# Patient Record
Sex: MALE | Race: WHITE | URBAN - METROPOLITAN AREA
[De-identification: names, ages, dates, MRNs, and addresses within clinical notes are randomized per-mention and may not be internally consistent; named-entity substitution may affect disease eponyms.]

---

## 2018-05-13 ENCOUNTER — OFFICE VISIT (OUTPATIENT)
Dept: URGENT CARE | Facility: URGENT CARE | Age: 18
End: 2018-05-13
Payer: COMMERCIAL

## 2018-05-13 VITALS
TEMPERATURE: 98 F | SYSTOLIC BLOOD PRESSURE: 120 MMHG | RESPIRATION RATE: 16 BRPM | HEIGHT: 75 IN | HEART RATE: 90 BPM | DIASTOLIC BLOOD PRESSURE: 70 MMHG | BODY MASS INDEX: 21.76 KG/M2 | OXYGEN SATURATION: 98 % | WEIGHT: 175 LBS

## 2018-05-13 DIAGNOSIS — J20.8 ACUTE VIRAL BRONCHITIS: Primary | ICD-10-CM

## 2018-05-13 PROCEDURE — 99203 OFFICE O/P NEW LOW 30 MIN: CPT | Performed by: INTERNAL MEDICINE

## 2018-05-13 ASSESSMENT — ENCOUNTER SYMPTOMS
SHORTNESS OF BREATH: 0
WHEEZING: 1
MYALGIAS: 0

## 2018-05-13 NOTE — PROGRESS NOTES
"SUBJECTIVE:   Dexter Grove is a 17 year old male presenting with a chief complaint of   Chief Complaint   Patient presents with     Urgent Care     Pharyngitis     sick with cold symptoms and sore throat. Woke up worse yesterday. Has had URI for 2 weeks before this. Having chills and sweats now.        He is a new patient of [facility].    URI Adult    Onset of symptoms was yesterday morning.    Current and Associated symptoms: fever, chills, stuffy nose, cough - productive, sore throat and intermittent headache  Some blood streaked mucous    Has had 2 weeks nasal congestion  Treatment measures tried include Tylenol/Ibuprofen and OTC Cough med.  rest  Predisposing factors include ill contact: none known    Had strep test mid last week for hives.  Had rapid strep test negative       Review of Systems   Respiratory: Positive for wheezing. Negative for shortness of breath.    Musculoskeletal: Negative for myalgias.       Past Medical History:   Diagnosis Date     NO ACTIVE PROBLEMS      No family history on file.  Current Outpatient Prescriptions   Medication Sig Dispense Refill     MOTRIN 100 MG/5ML OR SUSP None Entered       ondansetron (ZOFRAN ODT) 4 MG disintegrating tablet Take 1 tablet (4 mg) by mouth 3 times daily (before meals) 6 tablet 0     Social History   Substance Use Topics     Smoking status: Never Smoker     Smokeless tobacco: Never Used     Alcohol use Not on file       OBJECTIVE  /70  Pulse 90  Temp 98  F (36.7  C) (Oral)  Resp 16  Ht 6' 3\" (1.905 m)  Wt 175 lb (79.4 kg)  SpO2 98%  BMI 21.87 kg/m2    Physical Exam   Constitutional: He appears well-developed and well-nourished.   HENT:   Nose: Nose normal.   Mouth/Throat: No oropharyngeal exudate.   Tm nl b  OP - large amount PND   Cardiovascular: Normal rate, regular rhythm and normal heart sounds.    Pulmonary/Chest: Effort normal and breath sounds normal.   Lymphadenopathy:     He has no cervical adenopathy.       Labs:  No results " found for this or any previous visit (from the past 24 hour(s)).        ASSESSMENT:      ICD-10-CM    1. Acute viral bronchitis J20.8         Medical Decision Making:    Differential Diagnosis:  URI Adult/Peds:  Bronchitis-viral and Viral upper respiratory illness    Serious Comorbid Conditions:  Adult:  None    PLAN:      Patient Instructions   Honey, menthol cough drops, vicks  Fluids  Rest    Ibuprofen    Call or return to clinic if symptoms worsen or fail to improve as anticipated.      Viral Bronchitis (Adult)    You have a viral bronchitis. Bronchitis is inflammation and swelling of the lining of the lungs. This is often caused by an infection. Symptoms include a dry, hacking cough that is worse at night. The cough may bring up yellow-green mucus. You may also feel short of breath or wheeze. Other symptoms may include tiredness, chest discomfort, and chills.  Bronchitis that is caused by a virus is not treated with antibiotics. Instead, medicines may be given to help relieve symptoms. Symptoms can last up to 2 weeks, although the cough may last much longer.  This illness is contagious during the first few days and is spread through the air by coughing and sneezing, or by direct contact (touching the sick person and then touching your own eyes, nose, or mouth).  Most viral illnesses resolve within 10 to 14 days with rest and simple home remedies, although they may sometimes last for several weeks.  Home care    If symptoms are severe, rest at home for the first 2 to 3 days. When you go back to your usual activities, don't let yourself get too tired.    Do not smoke. Also avoid being exposed to secondhand smoke.    You may use over-the-counter medicine to control fever or pain, unless another pain medicine was prescribed. If you have chronic liver or kidney disease or have ever had a stomach ulcer or gastrointestinal bleeding, talk with your healthcare provider before using these medicines. Also talk to your  provider if you are taking medicine to prevent blood clots. Aspirin should never be given to anyone younger than 18 years of age who is ill with a viral infection or fever. It may cause severe liver or brain damage.    Your appetite may be poor, so a light diet is fine. Avoid dehydration by drinking 6 to 8 glasses of fluids per day (such as water, soft drinks, sports drinks, juices, tea, or soup). Extra fluids will help loosen secretions in the nose and lungs.    Over-the-counter cough, cold, and sore-throat medicines will not shorten the length of the illness, but they may help to reduce symptoms. Don't use decongestants if you have high blood pressure.  Follow-up care  Follow up with your healthcare provider, or as advised. If you had an X-ray or ECG (electrocardiogram), a specialist will review it. You will be notified of any new findings that may affect your care.  If you are age 65 or older, or if you have a chronic lung disease or condition that affects your immune system, or you smoke, ask your healthcare provider about getting a pneumococcal vaccine and a yearly flu shot (influenza vaccine).  When to seek medical advice  Call your healthcare provider right away if any of these occur:    Fever of 100.4 F (38 C) or higher, or as directed by your healthcare provider    Coughing up increased amounts of colored sputum    Weakness, drowsiness, headache, facial pain, ear pain, or a stiff neck  Call 911  Call 911 if any of these occur:    Coughing up blood    Worsening weakness, drowsiness, headache, or stiff neck    Trouble breathing, wheezing, or pain with breathing  Date Last Reviewed: 9/13/2015 2000-2017 [employer]. [address] [place] [id number]. All rights reserved. This information is not intended as a substitute for professional medical care. Always follow your healthcare professional's instructions.

## 2018-05-13 NOTE — PATIENT INSTRUCTIONS
Honey, menthol cough drops, vicks  Fluids  Rest    Ibuprofen    Call or return to clinic if symptoms worsen or fail to improve as anticipated.      Viral Bronchitis (Adult)    You have a viral bronchitis. Bronchitis is inflammation and swelling of the lining of the lungs. This is often caused by an infection. Symptoms include a dry, hacking cough that is worse at night. The cough may bring up yellow-green mucus. You may also feel short of breath or wheeze. Other symptoms may include tiredness, chest discomfort, and chills.  Bronchitis that is caused by a virus is not treated with antibiotics. Instead, medicines may be given to help relieve symptoms. Symptoms can last up to 2 weeks, although the cough may last much longer.  This illness is contagious during the first few days and is spread through the air by coughing and sneezing, or by direct contact (touching the sick person and then touching your own eyes, nose, or mouth).  Most viral illnesses resolve within 10 to 14 days with rest and simple home remedies, although they may sometimes last for several weeks.  Home care    If symptoms are severe, rest at home for the first 2 to 3 days. When you go back to your usual activities, don't let yourself get too tired.    Do not smoke. Also avoid being exposed to secondhand smoke.    You may use over-the-counter medicine to control fever or pain, unless another pain medicine was prescribed. If you have chronic liver or kidney disease or have ever had a stomach ulcer or gastrointestinal bleeding, talk with your healthcare provider before using these medicines. Also talk to your provider if you are taking medicine to prevent blood clots. Aspirin should never be given to anyone younger than 18 years of age who is ill with a viral infection or fever. It may cause severe liver or brain damage.    Your appetite may be poor, so a light diet is fine. Avoid dehydration by drinking 6 to 8 glasses of fluids per day (such as water,  soft drinks, sports drinks, juices, tea, or soup). Extra fluids will help loosen secretions in the nose and lungs.    Over-the-counter cough, cold, and sore-throat medicines will not shorten the length of the illness, but they may help to reduce symptoms. Don't use decongestants if you have high blood pressure.  Follow-up care  Follow up with your healthcare provider, or as advised. If you had an X-ray or ECG (electrocardiogram), a specialist will review it. You will be notified of any new findings that may affect your care.  If you are age 65 or older, or if you have a chronic lung disease or condition that affects your immune system, or you smoke, ask your healthcare provider about getting a pneumococcal vaccine and a yearly flu shot (influenza vaccine).  When to seek medical advice  Call your healthcare provider right away if any of these occur:    Fever of 100.4 F (38 C) or higher, or as directed by your healthcare provider    Coughing up increased amounts of colored sputum    Weakness, drowsiness, headache, facial pain, ear pain, or a stiff neck  Call 911  Call 911 if any of these occur:    Coughing up blood    Worsening weakness, drowsiness, headache, or stiff neck    Trouble breathing, wheezing, or pain with breathing  Date Last Reviewed: 9/13/2015 2000-2017 [employer]. [address] [place] [address], [place] [address]. All rights reserved. This information is not intended as a substitute for professional medical care. Always follow your healthcare professional's instructions.

## 2018-05-13 NOTE — MR AVS SNAPSHOT
After Visit Summary   5/13/2018    Dexter Grove    MRN: [id number]           Patient Information     Date Of Birth          2000        Visit Information        Provider Department      5/13/2018 2:15 PM Clara May MD [facility]        Today's Diagnoses     Acute viral bronchitis    -  1      Care Instructions    Honey, menthol cough drops, vicks  Fluids  Rest    Ibuprofen    Call or return to clinic if symptoms worsen or fail to improve as anticipated.      Viral Bronchitis (Adult)    You have a viral bronchitis. Bronchitis is inflammation and swelling of the lining of the lungs. This is often caused by an infection. Symptoms include a dry, hacking cough that is worse at night. The cough may bring up yellow-green mucus. You may also feel short of breath or wheeze. Other symptoms may include tiredness, chest discomfort, and chills.  Bronchitis that is caused by a virus is not treated with antibiotics. Instead, medicines may be given to help relieve symptoms. Symptoms can last up to 2 weeks, although the cough may last much longer.  This illness is contagious during the first few days and is spread through the air by coughing and sneezing, or by direct contact (touching the sick person and then touching your own eyes, nose, or mouth).  Most viral illnesses resolve within 10 to 14 days with rest and simple home remedies, although they may sometimes last for several weeks.  Home care    If symptoms are severe, rest at home for the first 2 to 3 days. When you go back to your usual activities, don't let yourself get too tired.    Do not smoke. Also avoid being exposed to secondhand smoke.    You may use over-the-counter medicine to control fever or pain, unless another pain medicine was prescribed. If you have chronic liver or kidney disease or have ever had a stomach ulcer or gastrointestinal bleeding, talk with your healthcare provider before using these  medicines. Also talk to your provider if you are taking medicine to prevent blood clots. Aspirin should never be given to anyone younger than 18 years of age who is ill with a viral infection or fever. It may cause severe liver or brain damage.    Your appetite may be poor, so a light diet is fine. Avoid dehydration by drinking 6 to 8 glasses of fluids per day (such as water, soft drinks, sports drinks, juices, tea, or soup). Extra fluids will help loosen secretions in the nose and lungs.    Over-the-counter cough, cold, and sore-throat medicines will not shorten the length of the illness, but they may help to reduce symptoms. Don't use decongestants if you have high blood pressure.  Follow-up care  Follow up with your healthcare provider, or as advised. If you had an X-ray or ECG (electrocardiogram), a specialist will review it. You will be notified of any new findings that may affect your care.  If you are age 65 or older, or if you have a chronic lung disease or condition that affects your immune system, or you smoke, ask your healthcare provider about getting a pneumococcal vaccine and a yearly flu shot (influenza vaccine).  When to seek medical advice  Call your healthcare provider right away if any of these occur:    Fever of 100.4 F (38 C) or higher, or as directed by your healthcare provider    Coughing up increased amounts of colored sputum    Weakness, drowsiness, headache, facial pain, ear pain, or a stiff neck  Call 911  Call 911 if any of these occur:    Coughing up blood    Worsening weakness, drowsiness, headache, or stiff neck    Trouble breathing, wheezing, or pain with breathing  Date Last Reviewed: 9/13/2015 2000-2017 [employer]. [address] [place] [address]. All rights reserved. This information is not intended as a substitute for professional medical care. Always follow your healthcare professional's instructions.                Follow-ups after your visit       "  Who to contact     If you have questions or need follow up information about today's clinic visit or your schedule please contact [facility] directly at [phone].  Normal or non-critical lab and imaging results will be communicated to you by MyChart, letter or phone within 4 business days after the clinic has received the results. If you do not hear from us within 7 days, please contact the clinic through MyChart or phone. If you have a critical or abnormal lab result, we will notify you by phone as soon as possible.  Submit refill requests through [employer] or call your pharmacy and they will forward the refill request to us. Please allow 3 business days for your refill to be completed.          Additional Information About Your Visit        [facility] Information     Mobile [employer] lets you send messages to your doctor, view your test results, renew your prescriptions, schedule appointments and more. To sign up, go to [email or web address] . Click on \"Log in\" on the left side of the screen, which will take you to the Welcome page. Then click on \"Sign up Now\" on the right side of the page.     You will be asked to enter the access code listed below, as well as some personal information. Please follow the directions to create your username and password.     Your access code is: [email or web address]  Expires: 2018  2:36 PM     Your access code will  in 90 days. If you need help or a new code, please call your [facility] clinic or [phone].        Care EveryWhere ID     This is your Care EveryWhere ID. This could be used by other organizations to access your [facility] medical records  [id number]        Your Vitals Were     Pulse Temperature Respirations Height Pulse Oximetry BMI (Body Mass Index)    90 98  F (36.7  C) (Oral) 16 6' 3\" (1.905 m) 98% 21.87 kg/m2       Blood Pressure from Last 3 Encounters:   18 120/70   14 116/63    Weight from Last 3 Encounters:   18 175 lb " (79.4 kg) (77 %)*   07/05/14 134 lb (60.8 kg) (52 %)*   08/12/07 71 lb (32.2 kg) (76 %)*     * Growth percentiles are based on [facility] 2-20 Years data.              Today, you had the following     No orders found for display       Primary Care Provider Fax #    Provider Not In System [phone]                Equal Access to Services     [facility] : Hadii aad ku hadasho Soomaali, waaxda luqadaha, qaybta kaalmada adeegyada, waxay idiin haykrystenn adeeg adriana laEllyaan . So [facility] [phone].    ATENCIÓN: Si habla español, tiene a herring disposición servicios gratuitos de asistencia lingüística. Llame al [phone].    We comply with applicable federal civil rights laws and [place] laws. We do not discriminate on the basis of race, color, national origin, age, disability, sex, sexual orientation, or gender identity.            Thank you!     Thank you for choosing [facility]  for your care. Our goal is always to provide you with excellent care. Hearing back from our patients is one way we can continue to improve our services. Please take a few minutes to complete the written survey that you may receive in the mail after your visit with us. Thank you!             Your Updated Medication List - Protect others around you: Learn how to safely use, store and throw away your medicines at www.disposemymeds.org.          This list is accurate as of 5/13/18  2:36 PM.  Always use your most recent med list.                   Brand Name Dispense Instructions for use Diagnosis    MOTRIN 100 MG/5ML suspension   Generic drug:  ibuprofen      None Entered        ondansetron 4 MG ODT tab    ZOFRAN ODT    6 tablet    Take 1 tablet (4 mg) by mouth 3 times daily (before meals)    Nausea

## 2020-02-16 VITALS
RESPIRATION RATE: 16 BRPM | HEART RATE: 77 BPM | OXYGEN SATURATION: 100 % | SYSTOLIC BLOOD PRESSURE: 134 MMHG | TEMPERATURE: 97.3 F | HEIGHT: 75 IN | DIASTOLIC BLOOD PRESSURE: 73 MMHG | WEIGHT: 180 LBS | BODY MASS INDEX: 22.38 KG/M2

## 2020-02-16 PROCEDURE — 2W3NX1Z IMMOBILIZATION OF RIGHT UPPER LEG USING SPLINT: ICD-10-PCS | Performed by: EMERGENCY MEDICINE

## 2020-02-16 PROCEDURE — 99283 EMERGENCY DEPT VISIT LOW MDM: CPT | Mod: 25

## 2020-02-16 PROCEDURE — 29505 APPLICATION LONG LEG SPLINT: CPT | Mod: RT

## 2020-02-17 ENCOUNTER — TRANSCRIBE ORDERS (OUTPATIENT)
Dept: SCHEDULING | Age: 20
End: 2020-02-17

## 2020-02-17 ENCOUNTER — APPOINTMENT (EMERGENCY)
Dept: RADIOLOGY | Facility: HOSPITAL | Age: 20
End: 2020-02-17
Attending: EMERGENCY MEDICINE
Payer: COMMERCIAL

## 2020-02-17 ENCOUNTER — HOSPITAL ENCOUNTER (EMERGENCY)
Facility: HOSPITAL | Age: 20
Discharge: HOME | End: 2020-02-17
Attending: EMERGENCY MEDICINE
Payer: COMMERCIAL

## 2020-02-17 DIAGNOSIS — S89.91XA INJURY OF RIGHT KNEE, INITIAL ENCOUNTER: Primary | ICD-10-CM

## 2020-02-17 DIAGNOSIS — M25.561 PAIN IN RIGHT KNEE: ICD-10-CM

## 2020-02-17 DIAGNOSIS — S83.91XA SPRAIN OF UNSPECIFIED SITE OF RIGHT KNEE, INITIAL ENCOUNTER: Primary | ICD-10-CM

## 2020-02-17 PROCEDURE — 73564 X-RAY EXAM KNEE 4 OR MORE: CPT | Mod: RT

## 2020-02-17 PROCEDURE — 63700000 HC SELF-ADMINISTRABLE DRUG: Performed by: PHYSICIAN ASSISTANT

## 2020-02-17 RX ORDER — IBUPROFEN 600 MG/1
600 TABLET ORAL ONCE
Status: COMPLETED | OUTPATIENT
Start: 2020-02-17 | End: 2020-02-17

## 2020-02-17 RX ORDER — IBUPROFEN 600 MG/1
TABLET ORAL
Status: DISCONTINUED
Start: 2020-02-17 | End: 2020-02-17 | Stop reason: HOSPADM

## 2020-02-17 RX ADMIN — IBUPROFEN 600 MG: 600 TABLET ORAL at 01:20

## 2020-02-17 ASSESSMENT — ENCOUNTER SYMPTOMS
BACK PAIN: 0
WOUND: 0
ARTHRALGIAS: 1
NECK PAIN: 0
VOMITING: 0
JOINT SWELLING: 1
HEADACHES: 0
NAUSEA: 0
WEAKNESS: 0

## 2020-02-17 NOTE — ED ATTESTATION NOTE
I have personally seen and examined the patient.  I reviewed and agree with physician assistant / nurse practitioner’s assessment and plan of care, with the following exceptions: None  My examination, assessment, and plan of care of Cholo Lo is as follows:     Patient is a 19-year-old male who presents emergency department for right knee injury.  Just prior to arrival, patient was playing basketball.  States that he jumped and was hit in the side of the knee by another player.  He thought that his kneecap may have moved but when he looked down it seemed to be in the right place.  Has pain in both lateral sides of the knee.  Has difficulty putting weight on the leg.  No history of knee issues.    On exam, patient is awake, alert, clinically well-appearing.  He has some mild discomfort with palpation of the right knee, range of motion is limited secondary to pain.  No overlying skin changes.  Neurovascularly intact.    X-ray does not show any bony abnormality.  Will give crutches, knee immobilizer, outpatient orthopedic follow-up as needed     Argelia Lazo MD  02/17/20 0992

## 2020-02-17 NOTE — ED PROVIDER NOTES
"HPI     Chief Complaint   Patient presents with   • Knee Injury     pt reports about 30min ago was hit playing basketball and R knee injury, now painful and swollen.        19 y.o. male presents to ED for evaluation of a knee injury. Pt states he was playing basketball 30 minutes ago. He went up for a rebound and was bumped by another player. Pt reports he felt a crack in his R knee and he developed knee pain. While in ED, pt c/o pain in the medial and lateral sides of his knee. Associated gait problem 2/2 pain, and joint swelling. No leg/fot numbness. Denies any other trauma or complaints.          History provided by:  Patient       Patient History     History reviewed. No pertinent past medical history.    History reviewed. No pertinent surgical history.    History reviewed. No pertinent family history.    Social History     Tobacco Use   • Smoking status: Current Some Day Smoker   Substance Use Topics   • Alcohol use: Yes   • Drug use: Never       Systems Reviewed from Nursing Triage:          Review of Systems     Review of Systems   Gastrointestinal: Negative for nausea and vomiting.   Musculoskeletal: Positive for arthralgias, gait problem and joint swelling. Negative for back pain and neck pain.   Skin: Negative for wound.   Neurological: Negative for weakness and headaches.        Physical Exam     ED Triage Vitals [02/16/20 2322]   Temp Heart Rate Resp BP SpO2   36.3 °C (97.3 °F) 77 16 134/73 100 %      Temp Source Heart Rate Source Patient Position BP Location FiO2 (%) (Set)   Tympanic Monitor -- -- --                     Patient Vitals for the past 24 hrs:   BP Temp Temp src Pulse Resp SpO2 Height Weight   02/16/20 2322 134/73 36.3 °C (97.3 °F) Tympanic 77 16 100 % 1.905 m (6' 3\") 81.6 kg (180 lb)                                       Physical Exam   Constitutional: He is oriented to person, place, and time. He appears well-developed and well-nourished.   HENT:   Head: Atraumatic.   Cardiovascular: " Intact distal pulses and normal pulses.   Pulses:       Popliteal pulses are 2+ on the right side, and 2+ on the left side.        Dorsalis pedis pulses are 2+ on the right side, and 2+ on the left side.        Posterior tibial pulses are 2+ on the right side, and 2+ on the left side.   Musculoskeletal:        Right knee: He exhibits decreased range of motion (limited to 120 degrees) and effusion (mild). No tenderness found.    medial valgus stress noted and positive lety test   Neurological: He is alert and oriented to person, place, and time. No sensory deficit.   Skin: Skin is warm and dry. Capillary refill takes less than 2 seconds.   Nursing note and vitals reviewed.       Procedures    ED Course & MDM     Labs Reviewed - No data to display    X-RAY KNEE RIGHT 4+ VIEWS    (Results Pending)         MDM    1:00 AM  Xray shows NAD. Pt is NVI on exam. Suspect ligamentous injury. Will place IN ACE, immobilizer, crutches and dc to f/u Orthopedics                                           By signing my name below, I, Mina Caraballo, attest that this documentation has been prepared under the direction and in the presence of Sonny Dominguez PA-C.    2/17/2020 12:03 AM    Sonny Borden PA-C  1:03 AM   Clinical Impressions as of Feb 17 0106   Injury of right knee, initial encounter        Mina Caraballo  02/17/20 0018       Sonny Borden PA C  02/17/20 0106

## 2020-02-17 NOTE — DISCHARGE INSTRUCTIONS
Rest, Ice, Compression, Elevation    Motrin/ibuprofen/aleve for pain/inflammation    Crutches as needed for ambulation     Follow up with orthopedics for re-evaluation in about 1 week

## 2020-02-21 ENCOUNTER — HOSPITAL ENCOUNTER (OUTPATIENT)
Dept: RADIOLOGY | Age: 20
Discharge: HOME | End: 2020-02-21
Attending: ORTHOPAEDIC SURGERY
Payer: COMMERCIAL

## 2020-02-21 DIAGNOSIS — M25.561 PAIN IN RIGHT KNEE: ICD-10-CM

## 2020-02-21 DIAGNOSIS — S83.91XA SPRAIN OF UNSPECIFIED SITE OF RIGHT KNEE, INITIAL ENCOUNTER: ICD-10-CM
